# Patient Record
Sex: FEMALE | Race: WHITE | NOT HISPANIC OR LATINO | URBAN - METROPOLITAN AREA
[De-identification: names, ages, dates, MRNs, and addresses within clinical notes are randomized per-mention and may not be internally consistent; named-entity substitution may affect disease eponyms.]

---

## 2021-12-08 ENCOUNTER — EMERGENCY (EMERGENCY)
Facility: HOSPITAL | Age: 21
LOS: 1 days | Discharge: HOME | End: 2021-12-08
Attending: EMERGENCY MEDICINE | Admitting: EMERGENCY MEDICINE
Payer: COMMERCIAL

## 2021-12-08 VITALS
RESPIRATION RATE: 18 BRPM | TEMPERATURE: 98 F | HEIGHT: 62 IN | OXYGEN SATURATION: 98 % | WEIGHT: 113.98 LBS | HEART RATE: 88 BPM

## 2021-12-08 VITALS
DIASTOLIC BLOOD PRESSURE: 67 MMHG | RESPIRATION RATE: 18 BRPM | TEMPERATURE: 98 F | HEART RATE: 76 BPM | SYSTOLIC BLOOD PRESSURE: 108 MMHG | OXYGEN SATURATION: 97 %

## 2021-12-08 DIAGNOSIS — Z88.8 ALLERGY STATUS TO OTHER DRUGS, MEDICAMENTS AND BIOLOGICAL SUBSTANCES STATUS: ICD-10-CM

## 2021-12-08 DIAGNOSIS — M79.662 PAIN IN LEFT LOWER LEG: ICD-10-CM

## 2021-12-08 DIAGNOSIS — Z88.2 ALLERGY STATUS TO SULFONAMIDES: ICD-10-CM

## 2021-12-08 LAB
ANION GAP SERPL CALC-SCNC: 7 MMOL/L — LOW (ref 9–16)
BUN SERPL-MCNC: 20 MG/DL — SIGNIFICANT CHANGE UP (ref 7–23)
CALCIUM SERPL-MCNC: 9.2 MG/DL — SIGNIFICANT CHANGE UP (ref 8.5–10.5)
CHLORIDE SERPL-SCNC: 101 MMOL/L — SIGNIFICANT CHANGE UP (ref 96–108)
CO2 SERPL-SCNC: 29 MMOL/L — SIGNIFICANT CHANGE UP (ref 22–31)
CREAT SERPL-MCNC: 0.79 MG/DL — SIGNIFICANT CHANGE UP (ref 0.5–1.3)
D DIMER BLD IA.RAPID-MCNC: <187 NG/ML DDU — SIGNIFICANT CHANGE UP
GLUCOSE SERPL-MCNC: 91 MG/DL — SIGNIFICANT CHANGE UP (ref 70–99)
HCG SERPL-ACNC: <1 MIU/ML — SIGNIFICANT CHANGE UP
HCT VFR BLD CALC: 40.4 % — SIGNIFICANT CHANGE UP (ref 34.5–45)
HGB BLD-MCNC: 14.2 G/DL — SIGNIFICANT CHANGE UP (ref 11.5–15.5)
MCHC RBC-ENTMCNC: 30.1 PG — SIGNIFICANT CHANGE UP (ref 27–34)
MCHC RBC-ENTMCNC: 35.1 GM/DL — SIGNIFICANT CHANGE UP (ref 32–36)
MCV RBC AUTO: 85.8 FL — SIGNIFICANT CHANGE UP (ref 80–100)
NRBC # BLD: 0 /100 WBCS — SIGNIFICANT CHANGE UP (ref 0–0)
PLATELET # BLD AUTO: 175 K/UL — SIGNIFICANT CHANGE UP (ref 150–400)
POTASSIUM SERPL-MCNC: 4 MMOL/L — SIGNIFICANT CHANGE UP (ref 3.5–5.3)
POTASSIUM SERPL-SCNC: 4 MMOL/L — SIGNIFICANT CHANGE UP (ref 3.5–5.3)
RBC # BLD: 4.71 M/UL — SIGNIFICANT CHANGE UP (ref 3.8–5.2)
RBC # FLD: 11.9 % — SIGNIFICANT CHANGE UP (ref 10.3–14.5)
SODIUM SERPL-SCNC: 137 MMOL/L — SIGNIFICANT CHANGE UP (ref 132–145)
WBC # BLD: 5.42 K/UL — SIGNIFICANT CHANGE UP (ref 3.8–10.5)
WBC # FLD AUTO: 5.42 K/UL — SIGNIFICANT CHANGE UP (ref 3.8–10.5)

## 2021-12-08 PROCEDURE — 93971 EXTREMITY STUDY: CPT | Mod: 26,LT

## 2021-12-08 PROCEDURE — 99284 EMERGENCY DEPT VISIT MOD MDM: CPT

## 2021-12-08 NOTE — ED PROVIDER NOTE - PATIENT PORTAL LINK FT
You can access the FollowMyHealth Patient Portal offered by [facility] by registering at the following website: [email or web address]. By joining [facility]’s FollowMyHealth portal, you will also be able to view your health information using other applications (apps) compatible with our system.

## 2021-12-08 NOTE — ED PROVIDER NOTE - NSFOLLOWUPINSTRUCTIONS_ED_ALL_ED_FT
You were seen in the ED today for evaluation of left calf pain, which may be either due to a muscular strain or Leg Cramps.  There was no evidence of a blood clot on ultrasound evaluation, and your electrolytes and blood work were within normal limits.    Leg cramps occur when one or more muscles tighten and you have no control over this tightening (involuntary muscle contraction). Muscle cramps can develop in any muscle, but the most common place is in the calf muscles of the leg. Those cramps can occur during exercise or when you are at rest. Leg cramps are painful, and they may last for a few seconds to a few minutes. Cramps may return several times before they finally stop.    Usually, leg cramps are not caused by a serious medical problem. In many cases, the cause is not known. Some common causes include:    Excessive physical effort (overexertion), such as during intense exercise.  Overuse from repetitive motions, or doing the same thing over and over.  Staying in a certain position for a long period of time.  Improper preparation, form, or technique while performing a sport or an activity.  Dehydration.  Injury.  Side effects of certain medicines.  Abnormally low levels of minerals in your blood (electrolytes), especially potassium and calcium. This could result from:    Pregnancy.  Taking diuretic medicines.    Follow these instructions at home:      Eating and drinking    Drink enough fluid to keep your urine pale yellow. Staying hydrated may help prevent cramps.  Eat a healthy diet that includes plenty of nutrients to help your muscles function. A healthy diet includes fruits and vegetables, lean protein, whole grains, and low-fat or nonfat dairy products.        Managing pain, stiffness, and swelling      Try massaging, stretching, and relaxing the affected muscle. Do this for several minutes at a time.  If directed, put ice on areas that are sore or painful after a cramp:    Put ice in a plastic bag.  Place a towel between your skin and the bag.  Leave the ice on for 20 minutes, 2–3 times a day.  If directed, apply heat to muscles that are tense or tight. Do this before you exercise, or as often as told by your health care provider. Use the heat source that your health care provider recommends, such as a moist heat pack or a heating pad.    Place a towel between your skin and the heat source.  Leave the heat on for 20–30 minutes.   Remove the heat if your skin turns bright red. This is especially important if you are unable to feel pain, heat, or cold. You may have a greater risk of getting burned.  Try taking hot showers or baths to help relax tight muscles.        General instructions    If you are having frequent leg cramps, avoid intense exercise for several days.  Take over-the-counter and prescription medicines only as told by your health care provider.  Keep all follow-up visits as told by your health care provider. This is important.    Contact a health care provider if:  Your leg cramps get more severe or more frequent, or they do not improve over time.  Your foot becomes cold, numb, or blue.    Summary  Muscle cramps can develop in any muscle, but the most common place is in the calf muscles of the leg.  Leg cramps are painful, and they may last for a few seconds to a few minutes.  Usually, leg cramps are not caused by a serious medical problem. Often, the cause is not known.  Stay hydrated and take over-the-counter and prescription medicines only as told by your health care provider.

## 2021-12-08 NOTE — ED ADULT TRIAGE NOTE - CHIEF COMPLAINT QUOTE
Pt presents to ED c/o left calf pain x2 days, no swelling noted, no CP/SOB. Pt has Mirena IUD in place. Denies any recent travel or tobacco use.

## 2021-12-08 NOTE — ED PROVIDER NOTE - CHIEF COMPLAINT
The patient is a 21y Female complaining of lower leg pain/injury.
3 = assistive equipment and person

## 2021-12-08 NOTE — ED PROVIDER NOTE - OBJECTIVE STATEMENT
She has had pain in left calf for 2 days.  Cannot think of any specific injury.  No swelling.  Pain worse with ambulation and dorsiflexion/plantarflexion of ankle.  No chest pain or SOB.  No recent travel.  No recent COVID infection.  Concerned about possibility of DVT.  Has implantable birth control.  Non-smoker.  No family hx of DVT or PE to the best of her knowledge.

## 2021-12-08 NOTE — ED PROVIDER NOTE - MUSCULOSKELETAL, MLM
Spine appears normal, range of motion is not limited.  Mild left calf tenderness.  Negative Jyoti's sign.  No cords palpable.  DNV intact.  No swelling.
